# Patient Record
(demographics unavailable — no encounter records)

---

## 2024-12-03 NOTE — PROCEDURE
[Normal Cochlear] : consistent with normal cochlear outer hair cell function  [OAE Present (Left)] : otoacoustic emissions present left ear [OAE Present (Right)] : otoacoustic emissions present right ear [] : Acoustic Immittance: [Normal Eardrum Mobility] : consistent with normal eardrum mobility [Type A Tympanogram] : Type A Normal [VRA] : Visual Reinforcement Audiometry [Headphones] : headphones [Normal] : Normal  [FreeTextEntry2] : Reduced 4K Hz right ear

## 2024-12-03 NOTE — PLAN
[Continued Educational and Developmental Support] : Continued Educational and Developmental Support [FreeTextEntry2] : Referred back to [facility] for f/u

## 2024-12-03 NOTE — HISTORY OF PRESENT ILLNESS
[FreeTextEntry1] : 4yo male with speech delay seen today for Audiology evaluation. Referred by [facility]. Hearing testing in their office inconclusive. Receives speech, OT and PT services in school. Passed  hearing screening.